# Patient Record
Sex: MALE | Employment: STUDENT | URBAN - METROPOLITAN AREA
[De-identification: names, ages, dates, MRNs, and addresses within clinical notes are randomized per-mention and may not be internally consistent; named-entity substitution may affect disease eponyms.]

---
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
## 2025-04-02 ENCOUNTER — OFFICE VISIT (OUTPATIENT)
Dept: PEDIATRICS | Facility: CLINIC | Age: 20
End: 2025-04-02
Payer: COMMERCIAL

## 2025-04-02 VITALS — TEMPERATURE: 98 F | WEIGHT: 257 LBS | BODY MASS INDEX: 35.98 KG/M2 | HEIGHT: 71 IN

## 2025-04-02 DIAGNOSIS — L98.9 SCALP LESION: Primary | ICD-10-CM

## 2025-04-02 PROCEDURE — 3008F BODY MASS INDEX DOCD: CPT | Performed by: PEDIATRICS

## 2025-04-02 PROCEDURE — 99213 OFFICE O/P EST LOW 20 MIN: CPT | Performed by: PEDIATRICS

## 2025-04-02 PROCEDURE — G2211 COMPLEX E/M VISIT ADD ON: HCPCS | Performed by: PEDIATRICS

## 2025-04-02 NOTE — PROGRESS NOTES
"Jimmy Reyna is a 19 y.o. male who presents for Rash and Mass (On head).  Today he is accompanied by his father who independently provided history.    HPI  Jimmy has had a small lump on his head for several months (that he knows of).  No significant pain.  It is not changing in size.  No injury known.     Objective   Temp 36.7 °C (98 °F)   Ht 1.803 m (5' 11\")   Wt 117 kg (257 lb)   BMI 35.84 kg/m²     Physical Exam  Gen: well appearing  Head: There is a 3mm mobile well-circumscribed papule on the superior/posterior scalp.  It is non-tender.  There is no erythema or fluctuance.  No cervical or occipital adenopathy.    Assessment/Plan   Jimmy has a small papule on his head that has characteristics that suggest a benign etiology such as a lipoma or sebaceous cyst.  I recommended observation -- however, he should see dermatology or plastic surgery for removal if it becomes symptomatic, enlarges, or new lumps are found.  "